# Patient Record
Sex: MALE | Race: WHITE | NOT HISPANIC OR LATINO | URBAN - METROPOLITAN AREA
[De-identification: names, ages, dates, MRNs, and addresses within clinical notes are randomized per-mention and may not be internally consistent; named-entity substitution may affect disease eponyms.]

---

## 2024-03-08 ENCOUNTER — INPATIENT (INPATIENT)
Facility: HOSPITAL | Age: 39
LOS: 2 days | Discharge: HOME | DRG: 897 | End: 2024-03-11
Attending: HOSPITALIST | Admitting: STUDENT IN AN ORGANIZED HEALTH CARE EDUCATION/TRAINING PROGRAM
Payer: MEDICAID

## 2024-03-08 VITALS
RESPIRATION RATE: 26 BRPM | SYSTOLIC BLOOD PRESSURE: 131 MMHG | WEIGHT: 179.9 LBS | OXYGEN SATURATION: 97 % | HEIGHT: 72 IN | TEMPERATURE: 98 F | DIASTOLIC BLOOD PRESSURE: 82 MMHG | HEART RATE: 116 BPM

## 2024-03-08 LAB
ANION GAP SERPL CALC-SCNC: 17 MMOL/L — SIGNIFICANT CHANGE UP (ref 5–17)
BASOPHILS # BLD AUTO: 0.13 K/UL — SIGNIFICANT CHANGE UP (ref 0–0.2)
BASOPHILS NFR BLD AUTO: 1.7 % — SIGNIFICANT CHANGE UP (ref 0–2)
BUN SERPL-MCNC: 11.5 MG/DL — SIGNIFICANT CHANGE UP (ref 8–20)
CALCIUM SERPL-MCNC: 8.4 MG/DL — SIGNIFICANT CHANGE UP (ref 8.4–10.5)
CHLORIDE SERPL-SCNC: 96 MMOL/L — SIGNIFICANT CHANGE UP (ref 96–108)
CK MB CFR SERPL CALC: 4.1 NG/ML — SIGNIFICANT CHANGE UP (ref 0–6.7)
CK SERPL-CCNC: 281 U/L — HIGH (ref 30–200)
CO2 SERPL-SCNC: 21 MMOL/L — LOW (ref 22–29)
CREAT SERPL-MCNC: 0.79 MG/DL — SIGNIFICANT CHANGE UP (ref 0.5–1.3)
EGFR: 117 ML/MIN/1.73M2 — SIGNIFICANT CHANGE UP
EOSINOPHIL # BLD AUTO: 0.07 K/UL — SIGNIFICANT CHANGE UP (ref 0–0.5)
EOSINOPHIL NFR BLD AUTO: 0.9 % — SIGNIFICANT CHANGE UP (ref 0–6)
GIANT PLATELETS BLD QL SMEAR: PRESENT — SIGNIFICANT CHANGE UP
GLUCOSE SERPL-MCNC: 112 MG/DL — HIGH (ref 70–99)
HCT VFR BLD CALC: 41.2 % — SIGNIFICANT CHANGE UP (ref 39–50)
HGB BLD-MCNC: 15 G/DL — SIGNIFICANT CHANGE UP (ref 13–17)
LYMPHOCYTES # BLD AUTO: 0.13 K/UL — LOW (ref 1–3.3)
LYMPHOCYTES # BLD AUTO: 1.7 % — LOW (ref 13–44)
MANUAL SMEAR VERIFICATION: SIGNIFICANT CHANGE UP
MCHC RBC-ENTMCNC: 35.9 PG — HIGH (ref 27–34)
MCHC RBC-ENTMCNC: 36.4 GM/DL — HIGH (ref 32–36)
MCV RBC AUTO: 98.6 FL — SIGNIFICANT CHANGE UP (ref 80–100)
MONOCYTES # BLD AUTO: 0.47 K/UL — SIGNIFICANT CHANGE UP (ref 0–0.9)
MONOCYTES NFR BLD AUTO: 6.1 % — SIGNIFICANT CHANGE UP (ref 2–14)
NEUTROPHILS # BLD AUTO: 6.81 K/UL — SIGNIFICANT CHANGE UP (ref 1.8–7.4)
NEUTROPHILS NFR BLD AUTO: 88.7 % — HIGH (ref 43–77)
PLAT MORPH BLD: NORMAL — SIGNIFICANT CHANGE UP
PLATELET # BLD AUTO: 126 K/UL — LOW (ref 150–400)
POTASSIUM SERPL-MCNC: 3.1 MMOL/L — LOW (ref 3.5–5.3)
POTASSIUM SERPL-SCNC: 3.1 MMOL/L — LOW (ref 3.5–5.3)
RBC # BLD: 4.18 M/UL — LOW (ref 4.2–5.8)
RBC # FLD: 12.2 % — SIGNIFICANT CHANGE UP (ref 10.3–14.5)
RBC BLD AUTO: NORMAL — SIGNIFICANT CHANGE UP
SODIUM SERPL-SCNC: 134 MMOL/L — LOW (ref 135–145)
VARIANT LYMPHS # BLD: 0.9 % — SIGNIFICANT CHANGE UP (ref 0–6)
WBC # BLD: 7.68 K/UL — SIGNIFICANT CHANGE UP (ref 3.8–10.5)
WBC # FLD AUTO: 7.68 K/UL — SIGNIFICANT CHANGE UP (ref 3.8–10.5)

## 2024-03-08 PROCEDURE — 93010 ELECTROCARDIOGRAM REPORT: CPT

## 2024-03-08 PROCEDURE — 70450 CT HEAD/BRAIN W/O DYE: CPT | Mod: 26,MC

## 2024-03-08 PROCEDURE — 99291 CRITICAL CARE FIRST HOUR: CPT

## 2024-03-08 RX ORDER — DIAZEPAM 5 MG
20 TABLET ORAL ONCE
Refills: 0 | Status: DISCONTINUED | OUTPATIENT
Start: 2024-03-08 | End: 2024-03-08

## 2024-03-08 RX ORDER — POTASSIUM CHLORIDE 20 MEQ
40 PACKET (EA) ORAL EVERY 4 HOURS
Refills: 0 | Status: COMPLETED | OUTPATIENT
Start: 2024-03-08 | End: 2024-03-09

## 2024-03-08 RX ORDER — THIAMINE MONONITRATE (VIT B1) 100 MG
100 TABLET ORAL ONCE
Refills: 0 | Status: COMPLETED | OUTPATIENT
Start: 2024-03-08 | End: 2024-03-08

## 2024-03-08 RX ORDER — DIAZEPAM 5 MG
40 TABLET ORAL ONCE
Refills: 0 | Status: DISCONTINUED | OUTPATIENT
Start: 2024-03-08 | End: 2024-03-08

## 2024-03-08 RX ORDER — MAGNESIUM SULFATE 500 MG/ML
1 VIAL (ML) INJECTION ONCE
Refills: 0 | Status: COMPLETED | OUTPATIENT
Start: 2024-03-08 | End: 2024-03-08

## 2024-03-08 RX ADMIN — Medication 100 GRAM(S): at 23:36

## 2024-03-08 RX ADMIN — Medication 20 MILLIGRAM(S): at 21:04

## 2024-03-08 RX ADMIN — Medication 40 MILLIGRAM(S): at 22:34

## 2024-03-08 RX ADMIN — Medication 40 MILLIEQUIVALENT(S): at 23:36

## 2024-03-08 RX ADMIN — Medication 100 MILLIGRAM(S): at 21:04

## 2024-03-08 NOTE — ED ADULT TRIAGE NOTE - CHIEF COMPLAINT QUOTE
pt BIBEMS s/p seizures from alcohol withdraws as per ems pt had two seizures prior to arrival noted to have blood on lips from biting tongue CIWA elevated will document after triage pt brought to CC MD at bedside 20 of valium given iv push

## 2024-03-09 DIAGNOSIS — F10.939 ALCOHOL USE, UNSPECIFIED WITH WITHDRAWAL, UNSPECIFIED: ICD-10-CM

## 2024-03-09 LAB
ALBUMIN SERPL ELPH-MCNC: 4.1 G/DL — SIGNIFICANT CHANGE UP (ref 3.3–5.2)
ALP SERPL-CCNC: 83 U/L — SIGNIFICANT CHANGE UP (ref 40–120)
ALT FLD-CCNC: 37 U/L — SIGNIFICANT CHANGE UP
ANION GAP SERPL CALC-SCNC: 15 MMOL/L — SIGNIFICANT CHANGE UP (ref 5–17)
AST SERPL-CCNC: 80 U/L — HIGH
BASOPHILS # BLD AUTO: 0.1 K/UL — SIGNIFICANT CHANGE UP (ref 0–0.2)
BASOPHILS NFR BLD AUTO: 1.3 % — SIGNIFICANT CHANGE UP (ref 0–2)
BILIRUB SERPL-MCNC: 1 MG/DL — SIGNIFICANT CHANGE UP (ref 0.4–2)
BUN SERPL-MCNC: 11.1 MG/DL — SIGNIFICANT CHANGE UP (ref 8–20)
CALCIUM SERPL-MCNC: 8.8 MG/DL — SIGNIFICANT CHANGE UP (ref 8.4–10.5)
CHLORIDE SERPL-SCNC: 97 MMOL/L — SIGNIFICANT CHANGE UP (ref 96–108)
CK MB CFR SERPL CALC: 11 NG/ML — HIGH (ref 0–6.7)
CK SERPL-CCNC: 787 U/L — HIGH (ref 30–200)
CO2 SERPL-SCNC: 23 MMOL/L — SIGNIFICANT CHANGE UP (ref 22–29)
CREAT SERPL-MCNC: 0.67 MG/DL — SIGNIFICANT CHANGE UP (ref 0.5–1.3)
EGFR: 123 ML/MIN/1.73M2 — SIGNIFICANT CHANGE UP
EOSINOPHIL # BLD AUTO: 0.07 K/UL — SIGNIFICANT CHANGE UP (ref 0–0.5)
EOSINOPHIL NFR BLD AUTO: 0.9 % — SIGNIFICANT CHANGE UP (ref 0–6)
GLUCOSE SERPL-MCNC: 91 MG/DL — SIGNIFICANT CHANGE UP (ref 70–99)
HCT VFR BLD CALC: 42.1 % — SIGNIFICANT CHANGE UP (ref 39–50)
HGB BLD-MCNC: 15.1 G/DL — SIGNIFICANT CHANGE UP (ref 13–17)
IMM GRANULOCYTES NFR BLD AUTO: 0.4 % — SIGNIFICANT CHANGE UP (ref 0–0.9)
LYMPHOCYTES # BLD AUTO: 0.79 K/UL — LOW (ref 1–3.3)
LYMPHOCYTES # BLD AUTO: 10.4 % — LOW (ref 13–44)
MAGNESIUM SERPL-MCNC: 2.1 MG/DL — SIGNIFICANT CHANGE UP (ref 1.6–2.6)
MCHC RBC-ENTMCNC: 35.9 GM/DL — SIGNIFICANT CHANGE UP (ref 32–36)
MCHC RBC-ENTMCNC: 35.9 PG — HIGH (ref 27–34)
MCV RBC AUTO: 100 FL — SIGNIFICANT CHANGE UP (ref 80–100)
MONOCYTES # BLD AUTO: 1.06 K/UL — HIGH (ref 0–0.9)
MONOCYTES NFR BLD AUTO: 14 % — SIGNIFICANT CHANGE UP (ref 2–14)
NEUTROPHILS # BLD AUTO: 5.52 K/UL — SIGNIFICANT CHANGE UP (ref 1.8–7.4)
NEUTROPHILS NFR BLD AUTO: 73 % — SIGNIFICANT CHANGE UP (ref 43–77)
PLATELET # BLD AUTO: 127 K/UL — LOW (ref 150–400)
POTASSIUM SERPL-MCNC: 3.8 MMOL/L — SIGNIFICANT CHANGE UP (ref 3.5–5.3)
POTASSIUM SERPL-SCNC: 3.8 MMOL/L — SIGNIFICANT CHANGE UP (ref 3.5–5.3)
PROT SERPL-MCNC: 7.2 G/DL — SIGNIFICANT CHANGE UP (ref 6.6–8.7)
RBC # BLD: 4.21 M/UL — SIGNIFICANT CHANGE UP (ref 4.2–5.8)
RBC # FLD: 12.3 % — SIGNIFICANT CHANGE UP (ref 10.3–14.5)
SODIUM SERPL-SCNC: 135 MMOL/L — SIGNIFICANT CHANGE UP (ref 135–145)
WBC # BLD: 7.57 K/UL — SIGNIFICANT CHANGE UP (ref 3.8–10.5)
WBC # FLD AUTO: 7.57 K/UL — SIGNIFICANT CHANGE UP (ref 3.8–10.5)

## 2024-03-09 PROCEDURE — 99223 1ST HOSP IP/OBS HIGH 75: CPT

## 2024-03-09 PROCEDURE — 93010 ELECTROCARDIOGRAM REPORT: CPT

## 2024-03-09 RX ORDER — DIAZEPAM 5 MG
10 TABLET ORAL
Refills: 0 | Status: DISCONTINUED | OUTPATIENT
Start: 2024-03-09 | End: 2024-03-11

## 2024-03-09 RX ORDER — ACETAMINOPHEN 500 MG
650 TABLET ORAL EVERY 6 HOURS
Refills: 0 | Status: DISCONTINUED | OUTPATIENT
Start: 2024-03-09 | End: 2024-03-11

## 2024-03-09 RX ORDER — IBUPROFEN 200 MG
400 TABLET ORAL ONCE
Refills: 0 | Status: COMPLETED | OUTPATIENT
Start: 2024-03-09 | End: 2024-03-09

## 2024-03-09 RX ORDER — IBUPROFEN 200 MG
200 TABLET ORAL ONCE
Refills: 0 | Status: DISCONTINUED | OUTPATIENT
Start: 2024-03-09 | End: 2024-03-09

## 2024-03-09 RX ORDER — THIAMINE MONONITRATE (VIT B1) 100 MG
100 TABLET ORAL DAILY
Refills: 0 | Status: COMPLETED | OUTPATIENT
Start: 2024-03-09 | End: 2024-03-11

## 2024-03-09 RX ORDER — INFLUENZA VIRUS VACCINE 15; 15; 15; 15 UG/.5ML; UG/.5ML; UG/.5ML; UG/.5ML
0.5 SUSPENSION INTRAMUSCULAR ONCE
Refills: 0 | Status: DISCONTINUED | OUTPATIENT
Start: 2024-03-09 | End: 2024-03-11

## 2024-03-09 RX ORDER — FOLIC ACID 0.8 MG
1 TABLET ORAL DAILY
Refills: 0 | Status: DISCONTINUED | OUTPATIENT
Start: 2024-03-09 | End: 2024-03-11

## 2024-03-09 RX ORDER — SODIUM CHLORIDE 9 MG/ML
1000 INJECTION INTRAMUSCULAR; INTRAVENOUS; SUBCUTANEOUS
Refills: 0 | Status: COMPLETED | OUTPATIENT
Start: 2024-03-09 | End: 2024-03-09

## 2024-03-09 RX ADMIN — Medication 50 MILLIGRAM(S): at 04:07

## 2024-03-09 RX ADMIN — Medication 100 MILLIGRAM(S): at 12:36

## 2024-03-09 RX ADMIN — Medication 10 MILLIGRAM(S): at 13:38

## 2024-03-09 RX ADMIN — SODIUM CHLORIDE 100 MILLILITER(S): 9 INJECTION INTRAMUSCULAR; INTRAVENOUS; SUBCUTANEOUS at 04:06

## 2024-03-09 RX ADMIN — Medication 400 MILLIGRAM(S): at 13:06

## 2024-03-09 RX ADMIN — Medication 50 MILLIGRAM(S): at 12:35

## 2024-03-09 RX ADMIN — Medication 10 MILLIGRAM(S): at 23:32

## 2024-03-09 RX ADMIN — Medication 40 MILLIEQUIVALENT(S): at 02:47

## 2024-03-09 RX ADMIN — Medication 10 MILLIGRAM(S): at 04:07

## 2024-03-09 RX ADMIN — Medication 10 MILLIGRAM(S): at 20:50

## 2024-03-09 RX ADMIN — Medication 1 MILLIGRAM(S): at 12:35

## 2024-03-09 RX ADMIN — Medication 400 MILLIGRAM(S): at 12:36

## 2024-03-09 RX ADMIN — Medication 50 MILLIGRAM(S): at 17:23

## 2024-03-09 RX ADMIN — Medication 50 MILLIGRAM(S): at 07:27

## 2024-03-09 RX ADMIN — Medication 1 TABLET(S): at 12:36

## 2024-03-09 NOTE — H&P ADULT - NSHPLABSRESULTS_GEN_ALL_CORE
03-08    134<L>  |  96  |  11.5  ----------------------------<  112<H>  3.1<L>   |  21.0<L>  |  0.79    Ca    8.4      08 Mar 2024 20:52                            15.0   7.68  )-----------( 126      ( 08 Mar 2024 20:52 )             41.2

## 2024-03-09 NOTE — ED ADULT NURSE REASSESSMENT NOTE - NS ED NURSE REASSESS COMMENT FT1
Assumed care of pt from JULISA Busch.    Pt laying in bed, A&Ox4 in NAD @ this time. RR even & unlabored. Pt sinus tach on cardiac monitor. Pt given apple juice & warm blanket. Pt denies any pain, complaints, or other needs @ this time. Pt awaiting admission. Safety maintained.
Pt laying in bed, resting. Pt A&Ox4 in NAD @ this time. RR even & unlabored. Pt NSR on cardiac monitor. Pt denies any pain, complaints, or needs @ this time. Pt awaiting admission. Safety maintained.     Report given to JULISA Duarte. Pt taken to U15R.

## 2024-03-09 NOTE — PROGRESS NOTE ADULT - ASSESSMENT
38yoM hx alcohol use disorder, RA, ADHD, anxiety disorder presenting with tonic clonic seizure by his girlfriend after an episode of seizure.  Pt drinks about 1 pint of alcohol daily, decided to stop abruptly a few days ago earlier this week. Denies any prior episode of hospital admission with seizure or alcohol withdrawal.       Alcohol withdrawal seizure  -CT head WNL  -Start Librium taper w/ IV valium PRN per CIWA protocol  -Start folic acid, MVI, thiamine and maintenance IVF x 20hr  -Seizure precautions  -No role for AED, EEG as etiology related to alcohol withdrawal   -Telemetry       Prophylactic measure  -No pharmacologic AC as low risk  -Intermittent pneumatic compressions  Diet: Regular

## 2024-03-09 NOTE — H&P ADULT - SOCIAL HISTORY: ALCOHOL USE
Alcohol use as per HPI Albendazole Counseling:  I discussed with the patient the risks of albendazole including but not limited to cytopenia, kidney damage, nausea/vomiting and severe allergy.  The patient understands that this medication is being used in an off-label manner.

## 2024-03-09 NOTE — PATIENT PROFILE ADULT - FALL HARM RISK - HARM RISK INTERVENTIONS
Communicate Risk of Fall with Harm to all staff/Reinforce activity limits and safety measures with patient and family/Review medications for side effects contributing to fall risk/Sit up slowly, dangle for a short time, stand at bedside before walking/Tailored Fall Risk Interventions/Visual Cue: Yellow wristband and red socks/Bed in lowest position, wheels locked, appropriate side rails in place/Call bell, personal items and telephone in reach/Instruct patient to call for assistance before getting out of bed or chair/Non-slip footwear when patient is out of bed/[place] to call system/Physically safe environment - no spills, clutter or unnecessary equipment/Purposeful Proactive Rounding/Room/bathroom lighting operational, light cord in reach

## 2024-03-09 NOTE — H&P ADULT - NSHPPHYSICALEXAM_GEN_ALL_CORE
Vital Signs Last 24 Hrs  T(C): 36.8 (09 Mar 2024 05:53), Max: 36.9 (08 Mar 2024 20:37)  T(F): 98.3 (09 Mar 2024 05:53), Max: 98.5 (08 Mar 2024 20:37)  HR: 89 (09 Mar 2024 05:53) (89 - 116)  BP: 128/87 (09 Mar 2024 05:53) (128/87 - 131/82)  BP(mean): --  RR: 19 (09 Mar 2024 05:53) (19 - 26)  SpO2: 96% (09 Mar 2024 05:53) (96% - 97%)    Parameters below as of 09 Mar 2024 05:53  Patient On (Oxygen Delivery Method): room air    GENERAL:  Well-appearing, not in acute distress  EYES:  Clear conjunctiva, extraocular movement intact  ENT: Moist mucous membranes  RESP:  Non-labored breathing pattern, lungs clear to ausculation in anterior fields  CV: Regular rate and rhythm, no murmurs appreciated, no lower extremity edema  GI: Soft, non-tender, non-distended  NEURO: Awake, alert, conversant, upper and lower extremity strength 5/5, light touch sensation grossly intact, no tremors  PSYCH: Calm, cooperative  SKIN: No rash or lesions, warm and dry

## 2024-03-09 NOTE — ED PROVIDER NOTE - CRITICAL CARE ATTENDING CONTRIBUTION TO CARE
I have personally provided _32__ minutes of critical care time exclusive of time spent on separately billable procedures. Time includes review of laboratory data, radiology results, discussion with consultants, and monitoring for potential decompensation. Interventions were performed as documented above

## 2024-03-09 NOTE — H&P ADULT - HISTORY OF PRESENT ILLNESS
38yoM hx alcohol use disorder presenting with tonic clonic seizure by his girlfriend.  Pt drinks about 1 pint of alcohol daily, decided to stop abruptly a few days ago earlier this week.  Pt reports prior withdrawal symptoms such as anxiety and insomnia when cutting back on alcohol.  He states he typically doesn’t get tremors and never had never had a seizure before.

## 2024-03-09 NOTE — ED ADULT NURSE NOTE - NSFALLRISKINTERV_ED_ALL_ED
Assistance OOB with selected safe patient handling equipment if applicable/Assistance with ambulation/Communicate fall risk and risk factors to all staff, patient, and family/Monitor gait and stability/Monitor for mental status changes and reorient to person, place, and time, as needed/Provide visual cue: yellow wristband, yellow gown, etc/Reinforce activity limits and safety measures with patient and family/Toileting schedule using arm’s reach rule for commode and bathroom/Use of alarms - bed, stretcher, chair and/or video monitoring/Call bell, personal items and telephone in reach/Instruct patient to call for assistance before getting out of bed/chair/stretcher/Non-slip footwear applied when patient is off stretcher/[place] to call system/Physically safe environment - no spills, clutter or unnecessary equipment/Purposeful Proactive Rounding/Room/bathroom lighting operational, light cord in reach

## 2024-03-09 NOTE — H&P ADULT - ASSESSMENT
38yoM hx alcohol use disorder presenting with tonic clonic seizure by his girlfriend after cutting back on alcohol a few days ago    Alcohol withdrawal seizure  -CT head WNL  -Start Librium taper w/ IV valium PRN per CIWA protocol  -Start folic acid, MVI, thiamine and maintenance IVF x 20hr  -Seizure precautions  -No role for AED, EEG as etiology related to alcohol withdrawal   -Telemetry     Hypokalemia  -K+ 3.1, repleted by ED w/ PO x 2, repeat in AM and replete accordingly     Prophylactic measure  -No pharmacologic AC as low risk  -Intermittent pneumatic compressions

## 2024-03-09 NOTE — ED ADULT NURSE NOTE - OBJECTIVE STATEMENT
pt is 38y male presenting to ED for alcohol withdrawal/seizures. pt states he has been having auditory hallucinations for the past couple of days, but did not do anything about it. as per family, pt also had seizures while sitting in the car. pt states last drink was this past tuesday. pt a&ox3, in no acute distress at this moment. resp even and unlabored. pt remains on . safety maintained.

## 2024-03-09 NOTE — ED PROVIDER NOTE - CLINICAL SUMMARY MEDICAL DECISION MAKING FREE TEXT BOX
patient tachycardic tremulous spoke with girlfriend who witnessed seizure sounds like a seizure.  alcohol withdrawal seizure replacing electrolytes tachycardia tremors markedly improved status post IV benzodiazepines will require admission to hospital for further monitoring to high risk for deterioration if sent home secondary to witnessed seizure abrupt cessation of alcohol patient and patient's family member agree to plan of care no SI or HI multiple bedside  reevaluations performed for hemodynamic monitoring need for abrupt treatment of alcohol withdrawal symptoms with seizure to prevent life-threatening seizures and deterioration

## 2024-03-09 NOTE — ED PROVIDER NOTE - ENMT, MLM
Airway patent, Nasal mucosa clear. + left tongue abrasion . Throat has no vesicles, no oropharyngeal exudates and uvula is midline.

## 2024-03-09 NOTE — PATIENT PROFILE ADULT - FUNCTIONAL SCREEN CURRENT LEVEL: COMMUNICATION, MLM
Called patient's  letting him know that the case was closing and the patient was doing well.    0 = understands/communicates without difficulty

## 2024-03-09 NOTE — H&P ADULT - REASON FOR ADMISSION
Arrived to room 2027 to remove temporary dialysis catheter. Patient is alert and oriented and verbalizes understanding of procedure. Dressing and sutures were removed. Site was scrubbed with chlorhexidine. Catheter was removed. Tip was intact. . Pressure was held for approximately 5 minutes. Dry sterile dressing was applied. Patient tolerated procedure well without any complications.   Report called to [address] [place] [address] on [facility]. Alcohol withdrawal seizure

## 2024-03-10 LAB
ALBUMIN SERPL ELPH-MCNC: 4.1 G/DL — SIGNIFICANT CHANGE UP (ref 3.3–5.2)
ALP SERPL-CCNC: 81 U/L — SIGNIFICANT CHANGE UP (ref 40–120)
ALT FLD-CCNC: 38 U/L — SIGNIFICANT CHANGE UP
ANION GAP SERPL CALC-SCNC: 18 MMOL/L — HIGH (ref 5–17)
AST SERPL-CCNC: 69 U/L — HIGH
BILIRUB SERPL-MCNC: 0.9 MG/DL — SIGNIFICANT CHANGE UP (ref 0.4–2)
BUN SERPL-MCNC: 11.5 MG/DL — SIGNIFICANT CHANGE UP (ref 8–20)
CALCIUM SERPL-MCNC: 9.2 MG/DL — SIGNIFICANT CHANGE UP (ref 8.4–10.5)
CHLORIDE SERPL-SCNC: 96 MMOL/L — SIGNIFICANT CHANGE UP (ref 96–108)
CO2 SERPL-SCNC: 22 MMOL/L — SIGNIFICANT CHANGE UP (ref 22–29)
CREAT SERPL-MCNC: 0.74 MG/DL — SIGNIFICANT CHANGE UP (ref 0.5–1.3)
EGFR: 120 ML/MIN/1.73M2 — SIGNIFICANT CHANGE UP
FOLATE SERPL-MCNC: 6.8 NG/ML — SIGNIFICANT CHANGE UP
GLUCOSE SERPL-MCNC: 87 MG/DL — SIGNIFICANT CHANGE UP (ref 70–99)
POTASSIUM SERPL-MCNC: 3.5 MMOL/L — SIGNIFICANT CHANGE UP (ref 3.5–5.3)
POTASSIUM SERPL-SCNC: 3.5 MMOL/L — SIGNIFICANT CHANGE UP (ref 3.5–5.3)
PROT SERPL-MCNC: 7.1 G/DL — SIGNIFICANT CHANGE UP (ref 6.6–8.7)
SODIUM SERPL-SCNC: 136 MMOL/L — SIGNIFICANT CHANGE UP (ref 135–145)
TSH SERPL-MCNC: 1.24 UIU/ML — SIGNIFICANT CHANGE UP (ref 0.27–4.2)
VIT B12 SERPL-MCNC: 444 PG/ML — SIGNIFICANT CHANGE UP (ref 232–1245)

## 2024-03-10 PROCEDURE — 99233 SBSQ HOSP IP/OBS HIGH 50: CPT

## 2024-03-10 PROCEDURE — 93010 ELECTROCARDIOGRAM REPORT: CPT

## 2024-03-10 RX ORDER — SODIUM CHLORIDE 9 MG/ML
1000 INJECTION, SOLUTION INTRAVENOUS
Refills: 0 | Status: COMPLETED | OUTPATIENT
Start: 2024-03-10 | End: 2024-03-10

## 2024-03-10 RX ORDER — DIAZEPAM 5 MG
10 TABLET ORAL ONCE
Refills: 0 | Status: DISCONTINUED | OUTPATIENT
Start: 2024-03-10 | End: 2024-03-10

## 2024-03-10 RX ADMIN — Medication 650 MILLIGRAM(S): at 22:11

## 2024-03-10 RX ADMIN — Medication 1 MILLIGRAM(S): at 14:19

## 2024-03-10 RX ADMIN — SODIUM CHLORIDE 100 MILLILITER(S): 9 INJECTION, SOLUTION INTRAVENOUS at 14:19

## 2024-03-10 RX ADMIN — Medication 10 MILLIGRAM(S): at 20:50

## 2024-03-10 RX ADMIN — Medication 50 MILLIGRAM(S): at 14:19

## 2024-03-10 RX ADMIN — Medication 100 MILLIGRAM(S): at 14:19

## 2024-03-10 RX ADMIN — Medication 1 TABLET(S): at 14:20

## 2024-03-10 RX ADMIN — Medication 650 MILLIGRAM(S): at 23:11

## 2024-03-10 RX ADMIN — Medication 50 MILLIGRAM(S): at 05:32

## 2024-03-10 RX ADMIN — Medication 50 MILLIGRAM(S): at 22:11

## 2024-03-10 NOTE — PROGRESS NOTE ADULT - ASSESSMENT
8yoM hx alcohol use disorder presenting with tonic clonic seizure by his girlfriend after cutting back on alcohol a few days ago    1- Alcoholism with withdrawal seizure  -CT head WNL   cont  Librium taper w/ IV valium PRN per CIWA   -cont  folic acid, MVI, thiamine and IVF   -Seizure precautions  -No role for AED, EEG as etiology related to alcohol withdrawal   on tele tachycardic     2-Hypokalemia  -K+ 3.1, repleted by ED  still low   will continue to supplement keep K over 4     3- Low vit b 12 and folic acid   cont folic acid , add   cyanocobalamin daily       Prophylactic measure  -No pharmacologic AC as low risk  -Intermittent pneumatic compressions    Pt is risk of leaving AMA , discharge in 1-2 days pending clinical progress

## 2024-03-11 ENCOUNTER — RESULT REVIEW (OUTPATIENT)
Age: 39
End: 2024-03-11

## 2024-03-11 VITALS
SYSTOLIC BLOOD PRESSURE: 134 MMHG | DIASTOLIC BLOOD PRESSURE: 86 MMHG | RESPIRATION RATE: 18 BRPM | TEMPERATURE: 98 F | HEART RATE: 100 BPM | OXYGEN SATURATION: 100 %

## 2024-03-11 DIAGNOSIS — F10.939 ALCOHOL USE, UNSPECIFIED WITH WITHDRAWAL, UNSPECIFIED: ICD-10-CM

## 2024-03-11 LAB
ALBUMIN SERPL ELPH-MCNC: 4 G/DL — SIGNIFICANT CHANGE UP (ref 3.3–5.2)
ALP SERPL-CCNC: 75 U/L — SIGNIFICANT CHANGE UP (ref 40–120)
ALT FLD-CCNC: 43 U/L — HIGH
ANION GAP SERPL CALC-SCNC: 13 MMOL/L — SIGNIFICANT CHANGE UP (ref 5–17)
AST SERPL-CCNC: 57 U/L — HIGH
BILIRUB SERPL-MCNC: 0.7 MG/DL — SIGNIFICANT CHANGE UP (ref 0.4–2)
BUN SERPL-MCNC: 11.7 MG/DL — SIGNIFICANT CHANGE UP (ref 8–20)
CALCIUM SERPL-MCNC: 9.3 MG/DL — SIGNIFICANT CHANGE UP (ref 8.4–10.5)
CHLORIDE SERPL-SCNC: 99 MMOL/L — SIGNIFICANT CHANGE UP (ref 96–108)
CO2 SERPL-SCNC: 26 MMOL/L — SIGNIFICANT CHANGE UP (ref 22–29)
CREAT SERPL-MCNC: 0.71 MG/DL — SIGNIFICANT CHANGE UP (ref 0.5–1.3)
EGFR: 121 ML/MIN/1.73M2 — SIGNIFICANT CHANGE UP
GLUCOSE SERPL-MCNC: 90 MG/DL — SIGNIFICANT CHANGE UP (ref 70–99)
MAGNESIUM SERPL-MCNC: 2 MG/DL — SIGNIFICANT CHANGE UP (ref 1.6–2.6)
PHOSPHATE SERPL-MCNC: 4 MG/DL — SIGNIFICANT CHANGE UP (ref 2.4–4.7)
POTASSIUM SERPL-MCNC: 3.4 MMOL/L — LOW (ref 3.5–5.3)
POTASSIUM SERPL-SCNC: 3.4 MMOL/L — LOW (ref 3.5–5.3)
PROT SERPL-MCNC: 7.1 G/DL — SIGNIFICANT CHANGE UP (ref 6.6–8.7)
SODIUM SERPL-SCNC: 138 MMOL/L — SIGNIFICANT CHANGE UP (ref 135–145)

## 2024-03-11 PROCEDURE — 93306 TTE W/DOPPLER COMPLETE: CPT

## 2024-03-11 PROCEDURE — 96375 TX/PRO/DX INJ NEW DRUG ADDON: CPT

## 2024-03-11 PROCEDURE — 82607 VITAMIN B-12: CPT

## 2024-03-11 PROCEDURE — 36415 COLL VENOUS BLD VENIPUNCTURE: CPT

## 2024-03-11 PROCEDURE — 85025 COMPLETE CBC W/AUTO DIFF WBC: CPT

## 2024-03-11 PROCEDURE — 93306 TTE W/DOPPLER COMPLETE: CPT | Mod: 26

## 2024-03-11 PROCEDURE — 80048 BASIC METABOLIC PNL TOTAL CA: CPT

## 2024-03-11 PROCEDURE — 70450 CT HEAD/BRAIN W/O DYE: CPT | Mod: MC

## 2024-03-11 PROCEDURE — 82746 ASSAY OF FOLIC ACID SERUM: CPT

## 2024-03-11 PROCEDURE — 99233 SBSQ HOSP IP/OBS HIGH 50: CPT

## 2024-03-11 PROCEDURE — 83735 ASSAY OF MAGNESIUM: CPT

## 2024-03-11 PROCEDURE — 84443 ASSAY THYROID STIM HORMONE: CPT

## 2024-03-11 PROCEDURE — 93005 ELECTROCARDIOGRAM TRACING: CPT

## 2024-03-11 PROCEDURE — 99285 EMERGENCY DEPT VISIT HI MDM: CPT

## 2024-03-11 PROCEDURE — 96374 THER/PROPH/DIAG INJ IV PUSH: CPT

## 2024-03-11 PROCEDURE — 82550 ASSAY OF CK (CPK): CPT

## 2024-03-11 PROCEDURE — 80053 COMPREHEN METABOLIC PANEL: CPT

## 2024-03-11 PROCEDURE — 82553 CREATINE MB FRACTION: CPT

## 2024-03-11 PROCEDURE — 84100 ASSAY OF PHOSPHORUS: CPT

## 2024-03-11 RX ORDER — POTASSIUM CHLORIDE 20 MEQ
40 PACKET (EA) ORAL ONCE
Refills: 0 | Status: COMPLETED | OUTPATIENT
Start: 2024-03-11 | End: 2024-03-11

## 2024-03-11 RX ORDER — PREGABALIN 225 MG/1
1000 CAPSULE ORAL DAILY
Refills: 0 | Status: DISCONTINUED | OUTPATIENT
Start: 2024-03-11 | End: 2024-03-11

## 2024-03-11 RX ADMIN — Medication 100 MILLIGRAM(S): at 08:05

## 2024-03-11 RX ADMIN — Medication 10 MILLIGRAM(S): at 01:09

## 2024-03-11 RX ADMIN — Medication 1 TABLET(S): at 08:06

## 2024-03-11 RX ADMIN — Medication 40 MILLIEQUIVALENT(S): at 13:01

## 2024-03-11 RX ADMIN — SODIUM CHLORIDE 100 MILLILITER(S): 9 INJECTION INTRAMUSCULAR; INTRAVENOUS; SUBCUTANEOUS at 04:30

## 2024-03-11 RX ADMIN — Medication 10 MILLIGRAM(S): at 08:02

## 2024-03-11 RX ADMIN — Medication 1 MILLIGRAM(S): at 08:05

## 2024-03-11 RX ADMIN — PREGABALIN 1000 MICROGRAM(S): 225 CAPSULE ORAL at 13:01

## 2024-03-11 RX ADMIN — Medication 50 MILLIGRAM(S): at 18:18

## 2024-03-11 NOTE — PROGRESS NOTE ADULT - ASSESSMENT
8yoM hx alcohol use disorder presenting with tonic clonic seizure by his girlfriend after cutting back on alcohol a few days ago    1- Alcoholism with withdrawal seizure  -CT head WNL   cont Librium taper w/ IV valium PRN per CIWA   -cont  folic acid, MVI, thiamine and IVF   -No role for AED, EEG as etiology related to alcohol withdrawal   CIWA score is 8 this am , cont to monitor   valium prn for anxiety     2-Hypokalemia  replace , k 3.4 today   will monitor       Prophylactic measure  -No pharmacologic AC as low risk  -Intermittent pneumatic compressions    Dc home once stable

## 2024-03-11 NOTE — PROGRESS NOTE ADULT - SUBJECTIVE AND OBJECTIVE BOX
SUBJECTIVE / OVERNIGHT EVENTS:    The pt seen at bedside this AM in ED; Wife and mother also present. Pt mentioned still having shakiness of upper extremities but much better. HE had a tongue bite last day during seizure episode. Denies any such episode prior or any prior hospitalization due to alcohol.     ADDITIONAL REVIEW OF SYSTEMS:    All ROS negative except as mentioned above     MEDICATIONS  (STANDING):  chlordiazePOXIDE 50 milliGRAM(s) Oral every 6 hours  chlordiazePOXIDE   Oral   folic acid 1 milliGRAM(s) Oral daily  multivitamin 1 Tablet(s) Oral daily  sodium chloride 0.9%. 1000 milliLiter(s) (100 mL/Hr) IV Continuous <Continuous>  thiamine 100 milliGRAM(s) Oral daily    MEDICATIONS  (PRN):  acetaminophen     Tablet .. 650 milliGRAM(s) Oral every 6 hours PRN Temp greater or equal to 38C (100.4F), Mild Pain (1 - 3), Moderate Pain (4 - 6)  diazepam  Injectable 10 milliGRAM(s) IV Push every 1 hour PRN CIWA-Ar score of 8 or Greater      I&O's Summary      PHYSICAL EXAM:  Vital Signs Last 24 Hrs  T(C): 36.8 (09 Mar 2024 11:53), Max: 37 (09 Mar 2024 09:38)  T(F): 98.2 (09 Mar 2024 11:53), Max: 98.6 (09 Mar 2024 09:38)  HR: 87 (09 Mar 2024 11:53) (87 - 116)  BP: 121/83 (09 Mar 2024 11:53) (115/72 - 131/82)  BP(mean): --  RR: 18 (09 Mar 2024 11:53) (18 - 26)  SpO2: 99% (09 Mar 2024 11:53) (96% - 99%)    Parameters below as of 09 Mar 2024 11:53  Patient On (Oxygen Delivery Method): room air      CONSTITUTIONAL: Anxious looking   ENMT: Moist oral mucosa, tongue laceration   RESPIRATORY: Normal respiratory effort; lungs are clear to auscultation bilaterally  CARDIOVASCULAR: Tachycardic but regular , normal S1 and S2, ; No lower extremity edema;  ABDOMEN: Nontender to palpation, normoactive bowel sounds,  PSYCH: A+O to person, place, and time    LABS:                        15.1   7.57  )-----------( 127      ( 09 Mar 2024 07:25 )             42.1     03-09    135  |  97  |  11.1  ----------------------------<  91  3.8   |  23.0  |  0.67    Ca    8.8      09 Mar 2024 07:25  Mg     2.1     03-09    TPro  7.2  /  Alb  4.1  /  TBili  1.0  /  DBili  x   /  AST  80<H>  /  ALT  37  /  AlkPhos  83  03-09      CARDIAC MARKERS ( 09 Mar 2024 07:25 )  x     / x     / 787 U/L / x     / 11.0 ng/mL  CARDIAC MARKERS ( 08 Mar 2024 20:52 )  x     / x     / 281 U/L / x     / 4.1 ng/mL      Urinalysis Basic - ( 09 Mar 2024 07:25 )    Color: x / Appearance: x / SG: x / pH: x  Gluc: 91 mg/dL / Ketone: x  / Bili: x / Urobili: x   Blood: x / Protein: x / Nitrite: x   Leuk Esterase: x / RBC: x / WBC x   Sq Epi: x / Non Sq Epi: x / Bacteria: x            RADIOLOGY & ADDITIONAL TESTS:  New Imaging Personally Reviewed Today:  New Electrocardiogram Personally Reviewed Today:  Other Results Reviewed Today:   Prior or Outpatient Records Reviewed Today with Summary:    COORDINATION OF CARE:  Consultant Communication and Details of Discussion (where applicable):    
Patient is a 38y old  Male who presents with a chief complaint of Alcohol withdrawal seizure (09 Mar 2024 13:06)      Patient seen and examined at bedside this am , mother is at the bedside   Pt is anxious wants to go home   His CIWA was 8 in am , given valium , on librium taper       ALLERGIES:  No Known Allergies    Vital Signs Last 24 Hrs  T(C): 36.7 (11 Mar 2024 10:42), Max: 36.8 (11 Mar 2024 00:54)  T(F): 98 (11 Mar 2024 10:42), Max: 98.2 (11 Mar 2024 00:54)  HR: 102 (11 Mar 2024 10:42) (84 - 119)  BP: 121/63 (11 Mar 2024 10:42) (121/63 - 136/83)  BP(mean): 83 (10 Mar 2024 15:51) (83 - 83)  RR: 18 (11 Mar 2024 10:42) (18 - 18)  SpO2: 99% (11 Mar 2024 10:42) (98% - 100%)    Parameters below as of 11 Mar 2024 10:42  Patient On (Oxygen Delivery Method): room air        PHYSICAL EXAM:  General: awake alert , oriented    Lungs: CTA bilateral   Cardio: RRR, S1/S2, No murmur  Abdomen: Soft, Nontender, Nondistended; Bowel sounds present  Extremities: No calf tenderness, No pitting edema  Skin : warm , normal color       03-11    138  |  99  |  11.7  ----------------------------<  90  3.4<L>   |  26.0  |  0.71    Ca    9.3      11 Mar 2024 03:05  Phos  4.0     03-11  Mg     2.0     03-11    TPro  7.1  /  Alb  4.0  /  TBili  0.7  /  DBili  x   /  AST  57<H>  /  ALT  43<H>  /  AlkPhos  75  03-11          CARDIAC MARKERS ( 09 Mar 2024 07:25 )  x     / x     / 787 U/L / x     / 11.0 ng/mL  CARDIAC MARKERS ( 08 Mar 2024 20:52 )  x     / x     / 281 U/L / x     / 4.1 ng/mL                        Urinalysis Basic - ( 09 Mar 2024 07:25 )    Color: x / Appearance: x / SG: x / pH: x  Gluc: 91 mg/dL / Ketone: x  / Bili: x / Urobili: x   Blood: x / Protein: x / Nitrite: x   Leuk Esterase: x / RBC: x / WBC x   Sq Epi: x / Non Sq Epi: x / Bacteria: x          RADIOLOGY & ADDITIONAL TESTS:      
Patient is a 38y old  Male who presents with a chief complaint of Alcohol withdrawal seizure (09 Mar 2024 13:06)      Patient seen and examined at bedside . he is awake alert   no seizure overnight , tachycardic on tele   ECG ordered , pt denies pain , no dizziness , no SOB , he wants to go home       Chart reviewed  No overnight events reported.     ALLERGIES:  No Known Allergies    MEDICATIONS  (STANDING):  chlordiazePOXIDE 50 milliGRAM(s) Oral every 8 hours  chlordiazePOXIDE   Oral   folic acid 1 milliGRAM(s) Oral daily  influenza   Vaccine 0.5 milliLiter(s) IntraMuscular once  multivitamin 1 Tablet(s) Oral daily  sodium chloride 0.9%. 1000 milliLiter(s) (100 mL/Hr) IV Continuous <Continuous>  thiamine 100 milliGRAM(s) Oral daily    MEDICATIONS  (PRN):  acetaminophen     Tablet .. 650 milliGRAM(s) Oral every 6 hours PRN Temp greater or equal to 38C (100.4F), Mild Pain (1 - 3), Moderate Pain (4 - 6)  diazepam  Injectable 10 milliGRAM(s) IV Push every 1 hour PRN CIWA-Ar score of 8 or Greater    Vital Signs Last 24 Hrs  T(F): 97.9 (10 Mar 2024 08:33), Max: 98.7 (10 Mar 2024 05:09)  HR: 103 (10 Mar 2024 08:33) (87 - 129)  BP: 133/98 (10 Mar 2024 08:33) (103/74 - 133/98)  RR: 18 (10 Mar 2024 08:33) (18 - 22)  SpO2: 98% (10 Mar 2024 08:33) (97% - 100%)  I&O's Summary    CIWA : 5 this am       PHYSICAL EXAM:  General: awake aler   Neck: supple , no JVD   Lungs: CTA bilateral   Cardio: RRR, S1/S2, No murmur  Abdomen: Soft, Nontender, Nondistended; Bowel sounds present  Extremities: No calf tenderness, No pitting edema    LABS:                        15.1   7.57  )-----------( 127      ( 09 Mar 2024 07:25 )             42.1     03-09    135  |  97  |  11.1  ----------------------------<  91  3.8   |  23.0  |  0.67    Ca    8.8      09 Mar 2024 07:25  Mg     2.1     03-09    TPro  7.2  /  Alb  4.1  /  TBili  1.0  /  DBili  x   /  AST  80  /  ALT  37  /  AlkPhos  83  03-09                CARDIAC MARKERS ( 09 Mar 2024 07:25 )  x     / x     / 787 U/L / x     / 11.0 ng/mL  CARDIAC MARKERS ( 08 Mar 2024 20:52 )  x     / x     / 281 U/L / x     / 4.1 ng/mL                        Urinalysis Basic - ( 09 Mar 2024 07:25 )    Color: x / Appearance: x / SG: x / pH: x  Gluc: 91 mg/dL / Ketone: x  / Bili: x / Urobili: x   Blood: x / Protein: x / Nitrite: x   Leuk Esterase: x / RBC: x / WBC x   Sq Epi: x / Non Sq Epi: x / Bacteria: x          RADIOLOGY & ADDITIONAL TESTS:

## 2024-03-11 NOTE — CHART NOTE - NSCHARTNOTEFT_GEN_A_CORE
PA NOTE-MEDICINE    Called by RN due to Pt requesting to sign out AMA.  Pt is A @ O x 4  Coherent appropriate   States full understanding of Risks of Leaving AMA and requests to leave despite risks.    AMA Form signed by All required parties and placed on chart  IV removed by RN   Will sign out to AM Team

## 2024-05-12 ENCOUNTER — NON-APPOINTMENT (OUTPATIENT)
Age: 39
End: 2024-05-12

## 2024-06-04 ENCOUNTER — NON-APPOINTMENT (OUTPATIENT)
Age: 39
End: 2024-06-04